# Patient Record
Sex: MALE | Race: WHITE | NOT HISPANIC OR LATINO
[De-identification: names, ages, dates, MRNs, and addresses within clinical notes are randomized per-mention and may not be internally consistent; named-entity substitution may affect disease eponyms.]

---

## 2023-08-02 ENCOUNTER — APPOINTMENT (OUTPATIENT)
Dept: ORTHOPEDIC SURGERY | Facility: CLINIC | Age: 63
End: 2023-08-02
Payer: MEDICARE

## 2023-08-02 VITALS — HEIGHT: 68 IN | WEIGHT: 180 LBS | BODY MASS INDEX: 27.28 KG/M2

## 2023-08-02 DIAGNOSIS — M19.041 PRIMARY OSTEOARTHRITIS, RIGHT HAND: ICD-10-CM

## 2023-08-02 DIAGNOSIS — M19.042 PRIMARY OSTEOARTHRITIS, RIGHT HAND: ICD-10-CM

## 2023-08-02 PROBLEM — Z00.00 ENCOUNTER FOR PREVENTIVE HEALTH EXAMINATION: Status: ACTIVE | Noted: 2023-08-02

## 2023-08-02 PROCEDURE — 99203 OFFICE O/P NEW LOW 30 MIN: CPT

## 2023-08-02 PROCEDURE — 73130 X-RAY EXAM OF HAND: CPT | Mod: 50

## 2023-08-03 NOTE — DATA REVIEWED
[FreeTextEntry1] : 4 views of the bilateral hands: No acute fractures or subluxations, mild degenerative changes noted

## 2023-08-03 NOTE — IMAGING
[de-identified] : Full/painless neck ROM  Bilateral hands Skin atraumatic No atrophy Diffuse degenerative changes noted in DIPs and PIP joints Full finger range of motion with mild stiffness - no motion noted at left ring DIP joint, fixed arthrodesis in 30 degrees flexion SILT throughout + AIN/PIN/Ulnar n. - Tinel at wrist + Phalen's

## 2023-08-03 NOTE — DISCUSSION/SUMMARY
[de-identified] : - reviewed the etiology/pathophysiology of each of his complaints today in layman's terms - discussed indications for both operative and nonoperative treatment - reviewed options including the role for bracing, anti-inflammatory medications and therapy - reviewed risks, benefits and alternatives to these - Hand therapy Rx provided today - NCS/EMG - NSAIDs as needed for pain - f/u after EMG

## 2023-08-03 NOTE — HISTORY OF PRESENT ILLNESS
[de-identified] : 08/02/2023  FABIEN 63 year M is here for Location: Bilateral Hands Complaint: Patient reports several years of finger pain and stiffness without antecedent event.  He also notes occasional numbness and tingling involving the bilateral hands, left greater than right.  Of note, the patient has a history of DIP arthrodesis of the left ring finger several years ago following an injury. Duration of complaints: years  Prior treatments: none Hand dominance: RIGHT  Occupation:

## 2023-08-09 ENCOUNTER — APPOINTMENT (OUTPATIENT)
Dept: NEUROLOGY | Facility: CLINIC | Age: 63
End: 2023-08-09

## 2023-08-16 ENCOUNTER — APPOINTMENT (OUTPATIENT)
Dept: ORTHOPEDIC SURGERY | Facility: CLINIC | Age: 63
End: 2023-08-16

## 2023-11-16 ENCOUNTER — APPOINTMENT (OUTPATIENT)
Dept: NEUROLOGY | Facility: CLINIC | Age: 63
End: 2023-11-16
Payer: MEDICARE

## 2023-11-16 PROCEDURE — 95886 MUSC TEST DONE W/N TEST COMP: CPT

## 2023-11-16 PROCEDURE — 95912 NRV CNDJ TEST 11-12 STUDIES: CPT

## 2023-11-21 ENCOUNTER — APPOINTMENT (OUTPATIENT)
Dept: ORTHOPEDIC SURGERY | Facility: CLINIC | Age: 63
End: 2023-11-21
Payer: MEDICARE

## 2023-11-21 ENCOUNTER — TRANSCRIPTION ENCOUNTER (OUTPATIENT)
Age: 63
End: 2023-11-21

## 2023-11-21 DIAGNOSIS — G56.03 CARPAL TUNNEL SYNDROM,BILATERAL UPPER LIMBS: ICD-10-CM

## 2023-11-21 DIAGNOSIS — G56.01 CARPAL TUNNEL SYNDROME, RIGHT UPPER LIMB: ICD-10-CM

## 2023-11-21 PROCEDURE — 99213 OFFICE O/P EST LOW 20 MIN: CPT

## 2023-11-21 PROCEDURE — 99203 OFFICE O/P NEW LOW 30 MIN: CPT

## 2023-12-29 ENCOUNTER — APPOINTMENT (OUTPATIENT)
Age: 63
End: 2023-12-29
Payer: MEDICARE

## 2023-12-29 PROCEDURE — 29848 WRIST ENDOSCOPY/SURGERY: CPT | Mod: LT

## 2023-12-29 RX ORDER — OXYCODONE AND ACETAMINOPHEN 5; 325 MG/1; MG/1
5-325 TABLET ORAL
Qty: 5 | Refills: 0 | Status: ACTIVE | COMMUNITY
Start: 2023-12-29 | End: 1900-01-01

## 2024-01-11 ENCOUNTER — APPOINTMENT (OUTPATIENT)
Dept: ORTHOPEDIC SURGERY | Facility: CLINIC | Age: 64
End: 2024-01-11
Payer: MEDICARE

## 2024-01-11 DIAGNOSIS — G56.02 CARPAL TUNNEL SYNDROME, LEFT UPPER LIMB: ICD-10-CM

## 2024-01-11 PROCEDURE — 99024 POSTOP FOLLOW-UP VISIT: CPT

## 2024-01-11 NOTE — IMAGING
[de-identified] : Full/painless neck ROM  Bilateral hands Skin atraumatic No atrophy Diffuse degenerative changes noted in DIPs and PIP joints Full finger range of motion with mild stiffness - no motion noted at left ring DIP joint, fixed arthrodesis in 30 degrees flexion SILT throughout + AIN/PIN/Ulnar n. - Tinel at wrist + Phalen's

## 2024-01-11 NOTE — PHYSICAL EXAM
[de-identified] : Left hand  Well-healed surgical incision, sutures in place No atrophy Diffuse degenerative changes noted in DIPs and PIP joints Full finger range of motion with mild stiffness - no motion noted at left ring DIP joint, fixed arthrodesis in 30 degrees flexion SILT throughout + AIN/PIN/Ulnar n. Fingers wwp

## 2024-01-11 NOTE — DATA REVIEWED
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  Bilateral acute and chronic C6, C7 radiculopathy

## 2024-01-11 NOTE — HISTORY OF PRESENT ILLNESS
[de-identified] : DOS: 12/29/2023 L endoCTR  1/11/24: The patient returns today for routine postoperative evaluation.  He notes significant improvement in his previous paresthesias.  He is quite happy with his outcome thus far.  No new complaints today.  08/02/2023  FABIEN 63 year M is here for Location: Bilateral Hands Complaint: Patient reports several years of finger pain and stiffness without antecedent event.  He also notes occasional numbness and tingling involving the bilateral hands, left greater than right.  Of note, the patient has a history of DIP arthrodesis of the left ring finger several years ago following an injury. Duration of complaints: years  Prior treatments: none Hand dominance: RIGHT  Occupation:

## 2024-01-11 NOTE — DISCUSSION/SUMMARY
[de-identified] : Reviewed postoperative expectations with the patient in depth Sutures out today Patient instructed on scar massage and gentle ROM NSAIDs as needed for pain Okay to continue activities as tolerated Follow-up as needed

## 2024-05-20 ENCOUNTER — APPOINTMENT (OUTPATIENT)
Dept: GASTROENTEROLOGY | Facility: CLINIC | Age: 64
End: 2024-05-20
Payer: MEDICARE

## 2024-05-20 VITALS
TEMPERATURE: 97.7 F | WEIGHT: 182 LBS | DIASTOLIC BLOOD PRESSURE: 74 MMHG | SYSTOLIC BLOOD PRESSURE: 128 MMHG | BODY MASS INDEX: 27.58 KG/M2 | HEIGHT: 68 IN

## 2024-05-20 DIAGNOSIS — Z12.11 ENCOUNTER FOR SCREENING FOR MALIGNANT NEOPLASM OF COLON: ICD-10-CM

## 2024-05-20 DIAGNOSIS — Z80.0 ENCOUNTER FOR SCREENING FOR MALIGNANT NEOPLASM OF COLON: ICD-10-CM

## 2024-05-20 DIAGNOSIS — Z78.9 OTHER SPECIFIED HEALTH STATUS: ICD-10-CM

## 2024-05-20 DIAGNOSIS — Z80.0 FAMILY HISTORY OF MALIGNANT NEOPLASM OF DIGESTIVE ORGANS: ICD-10-CM

## 2024-05-20 PROCEDURE — 99203 OFFICE O/P NEW LOW 30 MIN: CPT

## 2024-05-20 RX ORDER — LISINOPRIL 20 MG/1
20 TABLET ORAL
Refills: 0 | Status: ACTIVE | COMMUNITY

## 2024-05-20 NOTE — REVIEW OF SYSTEMS
[Fever] : no fever [Chills] : no chills [Recent Weight Loss (___ Lbs)] : no recent weight loss [Chest Pain] : no chest pain [Palpitations] : no palpitations [SOB on Exertion] : no shortness of breath during exertion [Abdominal Pain] : no abdominal pain [Constipation] : no constipation [Diarrhea] : no diarrhea [Heartburn] : no heartburn [Bleeding] : no bleeding [Bloating (gassiness)] : no bloating

## 2024-05-20 NOTE — PHYSICAL EXAM
[Normal] : heart rate was normal and rhythm regular, normal S1 and S2, no murmurs [Bowel Sounds] : normal bowel sounds [No Masses] : no abdominal mass palpated [Abdomen Soft] : soft [] : no hepatosplenomegaly

## 2024-05-20 NOTE — HISTORY OF PRESENT ILLNESS
[FreeTextEntry1] : I saw patient Colt Aguilar in the office today.  The patient is a 64-year-old male who has a history of hypertension but no diabetes or coronary issues.  Colt's appetite is generally good with no dysphagia or unexplained weight loss.  The patient's bowel movements are normal with no blood in the stool or on the toilet tissue.  The patient has a family history of colon cancer in his mother and had a colonoscopy done approximately 8 years ago.  The patient states it was normal at that time.  The patient has 1 caffeinated beverage a day will drink wine with dinner and does not smoke.  The patient had recent orthopedic surgery on his left elbow and a carpal tunnel release on the same side.

## 2024-07-03 ENCOUNTER — APPOINTMENT (OUTPATIENT)
Dept: GASTROENTEROLOGY | Facility: AMBULATORY MEDICAL SERVICES | Age: 64
End: 2024-07-03
Payer: MEDICARE

## 2024-07-03 PROCEDURE — 45378 DIAGNOSTIC COLONOSCOPY: CPT | Mod: PT
